# Patient Record
Sex: FEMALE | URBAN - METROPOLITAN AREA
[De-identification: names, ages, dates, MRNs, and addresses within clinical notes are randomized per-mention and may not be internally consistent; named-entity substitution may affect disease eponyms.]

---

## 2017-09-06 ENCOUNTER — IMPORTED ENCOUNTER (OUTPATIENT)
Dept: URBAN - METROPOLITAN AREA CLINIC 1 | Facility: CLINIC | Age: 37
End: 2017-09-06

## 2017-09-06 PROBLEM — H44.23: Noted: 2017-09-06

## 2017-09-06 PROCEDURE — S0621 ROUTINE OPHTHALMOLOGICAL EXA: HCPCS

## 2017-09-06 NOTE — PATIENT DISCUSSION
1. Myopia: Rx was given for correction if indicated and requested. 2. Dry Eyes w/ PEK [facility]ia for an appointment in 1 week cc with Dr. Magdalena Núñez.

## 2017-09-13 ENCOUNTER — IMPORTED ENCOUNTER (OUTPATIENT)
Dept: URBAN - METROPOLITAN AREA CLINIC 1 | Facility: CLINIC | Age: 37
End: 2017-09-13

## 2017-09-13 NOTE — PATIENT DISCUSSION
1.  CC today - Good fit good comfort. Va doing well. Finalized CTL RX. Return for an appointment for Return as scheduled with Dr. Greg Oneal.

## 2018-09-07 ENCOUNTER — IMPORTED ENCOUNTER (OUTPATIENT)
Dept: URBAN - METROPOLITAN AREA CLINIC 1 | Facility: CLINIC | Age: 38
End: 2018-09-07

## 2018-09-07 PROBLEM — H52.13: Noted: 2018-09-07

## 2018-09-07 PROBLEM — H44.23: Noted: 2018-09-07

## 2018-09-07 PROCEDURE — S0621 ROUTINE OPHTHALMOLOGICAL EXA: HCPCS

## 2018-11-23 NOTE — PATIENT DISCUSSION
1.  High Myopia OU -- Finalized Glasses MRx was given to patient today for correction if indicated and requested. 2. Dry Eyes w/ PEK OU -- Recommend the frequent use of OTC Refresh Optive AT's BID-QID OU (sample given). Finalized CTL Rx & given to patient today. Return for an appointment in 1 YR for a 36 / CC OU with Dr. Greg Oneal. Health Maintenance Due   Topic Date Due   • Influenza Vaccine (1) 08/01/2018       Patient is due for topics as listed above but declines Immunization(s) Influenza at this time.

## 2021-04-19 NOTE — PATIENT DISCUSSION
SUBJECTIVE VISUAL DISTURBANCE: FOLLOWING SPINE SURGERY. NO EMBOLUS OR EVIDENCE OF VASCULAR OCLUSION. REFER TO DR. Tawanda Dozier IN 5 MONTHS.

## 2021-05-19 NOTE — PATIENT DISCUSSION
SUBJECTIVE VISUAL DISTURBANCE OS: S/P SPINAL SURGERY. HVF TODAY WITHIN NORMAL LIMITS OU. CONTINUE TO FOLLOW-UP WITH PRIMARY CARE AS DIRECTED. MONITOR.

## 2021-09-07 NOTE — PATIENT DISCUSSION
SMALL CHRPE LESION, OD: STABLE. PRESCRIBED UV PROTECTION TO MINIMIZE UV EXPOSURE. RETURN AS SCHEDULED FOR EVALUATION AND PHOTO DOCUMENTATION.

## 2022-04-02 ASSESSMENT — VISUAL ACUITY
OD_SC: 20/20
OS_SC: 20/20
OD_SC: 20/20
OD_SC: 20/20
OS_SC: 20/20
OS_CC: J1+
OS_SC: 20/20
OD_CC: J1+

## 2022-04-02 ASSESSMENT — TONOMETRY
OD_IOP_MMHG: 14
OD_IOP_MMHG: 15
OS_IOP_MMHG: 15
OS_IOP_MMHG: 14
